# Patient Record
Sex: MALE | Race: ASIAN | URBAN - METROPOLITAN AREA
[De-identification: names, ages, dates, MRNs, and addresses within clinical notes are randomized per-mention and may not be internally consistent; named-entity substitution may affect disease eponyms.]

---

## 2019-04-29 ENCOUNTER — OFFICE (OUTPATIENT)
Dept: URBAN - METROPOLITAN AREA CLINIC 78 | Facility: CLINIC | Age: 54
End: 2019-04-29

## 2019-04-29 VITALS
HEIGHT: 64 IN | HEART RATE: 90 BPM | WEIGHT: 143 LBS | SYSTOLIC BLOOD PRESSURE: 141 MMHG | DIASTOLIC BLOOD PRESSURE: 100 MMHG | TEMPERATURE: 97.2 F

## 2019-04-29 DIAGNOSIS — R10.12 LEFT UPPER QUADRANT PAIN: ICD-10-CM

## 2019-04-29 DIAGNOSIS — K21.9 GASTRO-ESOPHAGEAL REFLUX DISEASE WITHOUT ESOPHAGITIS: ICD-10-CM

## 2019-04-29 DIAGNOSIS — R05 COUGH: ICD-10-CM

## 2019-04-29 DIAGNOSIS — R10.32 LEFT LOWER QUADRANT PAIN: ICD-10-CM

## 2019-04-29 DIAGNOSIS — K76.9 LIVER DISEASE, UNSPECIFIED: ICD-10-CM

## 2019-04-29 DIAGNOSIS — R14.2 ERUCTATION: ICD-10-CM

## 2019-04-29 DIAGNOSIS — R10.13 EPIGASTRIC PAIN: ICD-10-CM

## 2019-04-29 PROCEDURE — 99214 OFFICE O/P EST MOD 30 MIN: CPT

## 2019-04-29 NOTE — SERVICEHPINOTES
SABIHA KNOWLES   is a   54   male who presents for follow up after ER visit.  Had pain at left sided abdomen on 04/19/19, went to [facility] ER. Then went to [place] ER on 04/20/19 for second opinion and had another CT of abdomen and pelvis.BRCT of abdomen and pelvis on 04/19/19 showed signs of mild acute appendicitis, probable hepatic steatosis, and incomplete characterized hepatic lesions. No CT results from 04/20/19 at [facility] to review at this time.  Per patient, no evidence of acute appendicitis. BRThe pain has improved but still there. BRMoves bowel every other day on BSS type 3-4. Used to move bowel every day. Denies blood in stool, melena, diarrhea, constipation or significant weight loss. Last BM this morning, on BSS type 3. BRDenies nausea, vomiting or dysphagia.  Denies fever or chills.BR+ Epigastric pain which gets worse after eating. Has not been able to eat a lot. BROccasional acid reflux. + Belching and gas. + Cough after eating.BRCurrently not on any anti secretory medications. BROccasionally takes Motrin, approx. once a month for HA. Never had an EGD. BRLast colonoscopy in 11/2016 showed grade 2 internal hemorrhoids otherwise, normal colon.Denies family hx of colon cancer or stomach cancer.  BR

## 2019-05-02 ENCOUNTER — AMBULATORY SURGICAL CENTER (OUTPATIENT)
Dept: URBAN - METROPOLITAN AREA SURGERY 21 | Facility: SURGERY | Age: 54
End: 2019-05-02

## 2019-05-02 DIAGNOSIS — R10.13 EPIGASTRIC PAIN: ICD-10-CM

## 2019-05-02 DIAGNOSIS — K29.60 OTHER GASTRITIS WITHOUT BLEEDING: ICD-10-CM

## 2019-05-02 PROCEDURE — 43239 EGD BIOPSY SINGLE/MULTIPLE: CPT
